# Patient Record
Sex: MALE | Race: WHITE | NOT HISPANIC OR LATINO | Employment: FULL TIME | URBAN - METROPOLITAN AREA
[De-identification: names, ages, dates, MRNs, and addresses within clinical notes are randomized per-mention and may not be internally consistent; named-entity substitution may affect disease eponyms.]

---
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
## 2024-08-19 ENCOUNTER — TELEMEDICINE (OUTPATIENT)
Dept: FAMILY MEDICINE CLINIC | Facility: TELEHEALTH | Age: 24
End: 2024-08-19
Payer: COMMERCIAL

## 2024-08-19 VITALS — HEART RATE: 86 BPM | TEMPERATURE: 99.9 F

## 2024-08-19 DIAGNOSIS — J06.9 UPPER RESPIRATORY TRACT INFECTION, UNSPECIFIED TYPE: Primary | ICD-10-CM

## 2024-08-19 DIAGNOSIS — Z20.822 CLOSE EXPOSURE TO COVID-19 VIRUS: ICD-10-CM

## 2024-08-19 NOTE — LETTER
August 19, 2024     Patient: Malcom Luis   YOB: 2000   Date of Visit: 8/19/2024       To Whom it May Concern:    Malcom Luis was seen in my clinic on 8/19/2024. He  may return to work on 8/21/2024.            Sincerely,          DILIA Severino        CC: No Recipients

## 2024-08-19 NOTE — PROGRESS NOTES
You have chosen to receive care through a telehealth visit.  Do you consent to use a video/audio connection for your medical care today? Yes     HPI  Malcom Luis is a 24 y.o. male  presents with complaint of 2 day h/o headache, weakness, tactile fever, chills and body aches. His sister is positive for Covid. He is not taking anything for symptoms. He has been exposed to Covid 19.     Review of Systems   Constitutional:  Positive for chills, fatigue and fever (tactile).   HENT:          Negative    Respiratory: Negative.     Cardiovascular: Negative.    Gastrointestinal: Negative.    Musculoskeletal:  Positive for myalgias.   Skin: Negative.    Neurological:  Positive for headaches.   Hematological: Negative.    Psychiatric/Behavioral: Negative.         No past medical history on file.    No family history on file.    Social History     Socioeconomic History    Marital status: Single   Tobacco Use    Smoking status: Never    Smokeless tobacco: Never   Vaping Use    Vaping status: Never Used   Substance and Sexual Activity    Alcohol use: Never    Drug use: Never    Sexual activity: Defer         Pulse 86   Temp 99.9 °F (37.7 °C)     PHYSICAL EXAM  Physical Exam   Constitutional: He is oriented to person, place, and time. He appears well-developed and well-nourished. He does not have a sickly appearance. He does not appear ill. No distress.   HENT:   Head: Normocephalic and atraumatic.   Right Ear: Hearing normal. No drainage, swelling or tenderness.   Left Ear: Hearing normal. No drainage, swelling or tenderness.   Nose: Nose normal. Right sinus exhibits no maxillary sinus tenderness and no frontal sinus tenderness. Left sinus exhibits no maxillary sinus tenderness and no frontal sinus tenderness.   Mouth/Throat: Mouth/Lips are normal.  TM's clear bilaterally  Oral pharynx mildly red with exudate or swelling   Pulmonary/Chest: Effort normal.  He no audible wheeze...  Neurological: He is alert and oriented to  person, place, and time.   Psychiatric: He has a normal mood and affect.   Vitals reviewed.      Diagnoses and all orders for this visit:    1. Upper respiratory tract infection, unspecified type (Primary)  -     NELSY FLU + SARS PCR; Future    2. Close exposure to COVID-19 virus  -     NELSY FLU + SARS PCR; Future    Rest and fluids  Retest for Covid 19 tomorrow.   POC test negative for Covid 19 today.   IBU as directed prn pain and fever.       FOLLOW-UP  As discussed during visit with [facility], if symptoms worsen or fail to improve, follow-up with PCP/Urgent Care/Emergency Department.    Patient verbalizes understanding of medications, instructions for treatment and follow-up.    Tonia Zhang, DILIA  08/19/2024  08:26 EDT    The use of a video visit has been reviewed with the patient and verbal informed consent has been obtained. Myself and Malcom Luis participated in this visit. The patient is located in  [facility], and I am located in [place], [place]. MyChart and Tyto were utilized.